# Patient Record
Sex: FEMALE | Race: BLACK OR AFRICAN AMERICAN | NOT HISPANIC OR LATINO | Employment: FULL TIME | URBAN - METROPOLITAN AREA
[De-identification: names, ages, dates, MRNs, and addresses within clinical notes are randomized per-mention and may not be internally consistent; named-entity substitution may affect disease eponyms.]

---

## 2017-07-28 ENCOUNTER — LAB (OUTPATIENT)
Dept: LAB | Facility: HOSPITAL | Age: 37
End: 2017-07-28

## 2017-07-28 ENCOUNTER — TRANSCRIBE ORDERS (OUTPATIENT)
Dept: LAB | Facility: HOSPITAL | Age: 37
End: 2017-07-28

## 2017-07-28 DIAGNOSIS — Z00.00 ROUTINE GENERAL MEDICAL EXAMINATION AT A HEALTH CARE FACILITY: ICD-10-CM

## 2017-07-28 DIAGNOSIS — Z00.00 ROUTINE GENERAL MEDICAL EXAMINATION AT A HEALTH CARE FACILITY: Primary | ICD-10-CM

## 2017-07-28 LAB
BILIRUB UR QL STRIP: NEGATIVE
CLARITY UR: CLEAR
COLOR UR: YELLOW
DEPRECATED RDW RBC AUTO: 41.5 FL (ref 37–54)
ERYTHROCYTE [DISTWIDTH] IN BLOOD BY AUTOMATED COUNT: 12.7 % (ref 11.3–14.5)
GLUCOSE UR STRIP-MCNC: NEGATIVE MG/DL
HCT VFR BLD AUTO: 38.5 % (ref 34.5–44)
HGB BLD-MCNC: 12.6 G/DL (ref 11.5–15.5)
HGB UR QL STRIP.AUTO: NEGATIVE
KETONES UR QL STRIP: NEGATIVE
LEUKOCYTE ESTERASE UR QL STRIP.AUTO: NEGATIVE
MCH RBC QN AUTO: 29.2 PG (ref 27–31)
MCHC RBC AUTO-ENTMCNC: 32.7 G/DL (ref 32–36)
MCV RBC AUTO: 89.3 FL (ref 80–99)
NITRITE UR QL STRIP: NEGATIVE
PH UR STRIP.AUTO: 7.5 [PH] (ref 5–8)
PLATELET # BLD AUTO: 255 10*3/MM3 (ref 150–450)
PMV BLD AUTO: 11 FL (ref 6–12)
PROT UR QL STRIP: NEGATIVE
RBC # BLD AUTO: 4.31 10*6/MM3 (ref 3.89–5.14)
SP GR UR STRIP: 1.02 (ref 1–1.03)
TSH SERPL DL<=0.05 MIU/L-ACNC: 1.09 MIU/ML (ref 0.35–5.35)
UROBILINOGEN UR QL STRIP: NORMAL
WBC NRBC COR # BLD: 5.28 10*3/MM3 (ref 3.5–10.8)

## 2017-07-28 PROCEDURE — 84443 ASSAY THYROID STIM HORMONE: CPT

## 2017-07-28 PROCEDURE — 81003 URINALYSIS AUTO W/O SCOPE: CPT

## 2017-07-28 PROCEDURE — 85027 COMPLETE CBC AUTOMATED: CPT

## 2017-07-28 PROCEDURE — 36415 COLL VENOUS BLD VENIPUNCTURE: CPT

## 2019-06-18 ENCOUNTER — TRANSCRIBE ORDERS (OUTPATIENT)
Dept: ADMINISTRATIVE | Facility: HOSPITAL | Age: 39
End: 2019-06-18

## 2019-06-18 DIAGNOSIS — Z12.31 VISIT FOR SCREENING MAMMOGRAM: Primary | ICD-10-CM

## 2019-08-14 ENCOUNTER — HOSPITAL ENCOUNTER (OUTPATIENT)
Dept: MAMMOGRAPHY | Facility: HOSPITAL | Age: 39
Discharge: HOME | End: 2019-08-14
Admitting: OBSTETRICS & GYNECOLOGY

## 2019-08-14 DIAGNOSIS — Z12.31 VISIT FOR SCREENING MAMMOGRAM: ICD-10-CM

## 2019-08-14 PROCEDURE — 77067 SCR MAMMO BI INCL CAD: CPT

## 2019-08-14 PROCEDURE — 77067 SCR MAMMO BI INCL CAD: CPT | Performed by: RADIOLOGY

## 2019-08-14 PROCEDURE — 77063 BREAST TOMOSYNTHESIS BI: CPT

## 2019-08-14 PROCEDURE — 77063 BREAST TOMOSYNTHESIS BI: CPT | Performed by: RADIOLOGY

## 2019-09-11 ENCOUNTER — HOSPITAL ENCOUNTER (OUTPATIENT)
Dept: MAMMOGRAPHY | Facility: HOSPITAL | Age: 39
Discharge: HOME | End: 2019-09-11
Admitting: RADIOLOGY

## 2019-09-11 ENCOUNTER — HOSPITAL ENCOUNTER (OUTPATIENT)
Dept: ULTRASOUND IMAGING | Facility: HOSPITAL | Age: 39
Discharge: HOME | End: 2019-09-11

## 2019-09-11 DIAGNOSIS — R92.8 ABNORMAL MAMMOGRAM: ICD-10-CM

## 2019-09-11 PROCEDURE — 77066 DX MAMMO INCL CAD BI: CPT | Performed by: RADIOLOGY

## 2019-09-11 PROCEDURE — G0279 TOMOSYNTHESIS, MAMMO: HCPCS

## 2019-09-11 PROCEDURE — 76642 ULTRASOUND BREAST LIMITED: CPT

## 2019-09-11 PROCEDURE — 76642 ULTRASOUND BREAST LIMITED: CPT | Performed by: RADIOLOGY

## 2019-09-11 PROCEDURE — 77066 DX MAMMO INCL CAD BI: CPT

## 2019-09-11 PROCEDURE — 77062 BREAST TOMOSYNTHESIS BI: CPT | Performed by: RADIOLOGY

## 2020-07-06 ENCOUNTER — LAB REQUISITION (OUTPATIENT)
Dept: LAB | Facility: HOSPITAL | Age: 40
End: 2020-07-06

## 2020-07-06 DIAGNOSIS — Z00.00 ROUTINE GENERAL MEDICAL EXAMINATION AT A HEALTH CARE FACILITY: ICD-10-CM

## 2020-12-18 ENCOUNTER — IMMUNIZATION (OUTPATIENT)
Dept: VACCINE CLINIC | Facility: HOSPITAL | Age: 40
End: 2020-12-18

## 2020-12-18 PROCEDURE — 0001A: CPT

## 2020-12-18 PROCEDURE — 91300 HC SARSCOV02 VAC 30MCG/0.3ML IM: CPT

## 2021-01-08 ENCOUNTER — IMMUNIZATION (OUTPATIENT)
Dept: VACCINE CLINIC | Facility: HOSPITAL | Age: 41
End: 2021-01-08

## 2021-01-08 PROCEDURE — 91300 HC SARSCOV02 VAC 30MCG/0.3ML IM: CPT | Performed by: INTERNAL MEDICINE

## 2021-01-08 PROCEDURE — 0002A: CPT | Performed by: INTERNAL MEDICINE

## 2021-01-08 PROCEDURE — 0001A: CPT | Performed by: INTERNAL MEDICINE

## 2021-05-25 ENCOUNTER — TRANSCRIBE ORDERS (OUTPATIENT)
Dept: ADMINISTRATIVE | Facility: HOSPITAL | Age: 41
End: 2021-05-25

## 2021-05-25 DIAGNOSIS — Z12.31 VISIT FOR SCREENING MAMMOGRAM: Primary | ICD-10-CM

## 2021-07-26 ENCOUNTER — HOSPITAL ENCOUNTER (OUTPATIENT)
Dept: MAMMOGRAPHY | Facility: HOSPITAL | Age: 41
Discharge: HOME | End: 2021-07-26

## 2021-07-26 DIAGNOSIS — Z12.31 VISIT FOR SCREENING MAMMOGRAM: ICD-10-CM

## 2021-12-30 ENCOUNTER — APPOINTMENT (OUTPATIENT)
Dept: MAMMOGRAPHY | Facility: HOSPITAL | Age: 41
End: 2021-12-30

## 2022-03-04 ENCOUNTER — HOSPITAL ENCOUNTER (OUTPATIENT)
Dept: MAMMOGRAPHY | Facility: HOSPITAL | Age: 42
Discharge: HOME | End: 2022-03-04
Admitting: OBSTETRICS & GYNECOLOGY

## 2022-03-04 PROCEDURE — 77063 BREAST TOMOSYNTHESIS BI: CPT

## 2022-03-04 PROCEDURE — 77063 BREAST TOMOSYNTHESIS BI: CPT | Performed by: RADIOLOGY

## 2022-03-04 PROCEDURE — 77067 SCR MAMMO BI INCL CAD: CPT

## 2022-03-04 PROCEDURE — 77067 SCR MAMMO BI INCL CAD: CPT | Performed by: RADIOLOGY

## 2022-12-31 RX ORDER — AZITHROMYCIN 250 MG/1
TABLET, FILM COATED ORAL
Qty: 6 TABLET | Refills: 0 | Status: SHIPPED | OUTPATIENT
Start: 2022-12-31 | End: 2023-01-05

## 2023-01-20 ENCOUNTER — OFFICE VISIT (OUTPATIENT)
Dept: PHYSICAL THERAPY | Facility: CLINIC | Age: 43
End: 2023-01-20
Payer: COMMERCIAL

## 2023-01-20 DIAGNOSIS — R49.0 DYSPHONIA: Primary | ICD-10-CM

## 2023-01-20 PROCEDURE — 92524 BEHAVRAL QUALIT ANALYS VOICE: CPT | Performed by: SPEECH-LANGUAGE PATHOLOGIST

## 2023-01-20 NOTE — PROGRESS NOTES
Outpatient Speech Language Pathology   Adult/Peds Voice Initial Evaluation   [address]       Patient Name: Yahaira Olivier  : 1980  MRN: [id number]  Today's Date: 2023         Visit Date: 2023   There is no problem list on file for this patient.       No past medical history on file.     No past surgical history on file.      Visit Dx:    ICD-10-CM ICD-9-CM   1. Dysphonia  R49.0 784.42            OP SLP Assessment/Plan - 23 1300        SLP Assessment    Functional Problems Voice  -RB    Impact on Function- Voice Difficulty communicating;Difficulty communicating in an emergency;Restrictions in personal and social life;Unable to complete specified job requirements;Difficulty participating in avocational activities  -RB    Clinical Impression- Voice Severe voice disorder  -RB    Clinical Impression- PVFM Does not appear to present with PVFM  -RB    Functional Problems Comment impacts QOL, communication, impacts work tasks  -RB    Clinical Impression Comments would benefit from skilled ST  -RB    Please refer to paper survey for additional self-reported information Yes  -RB    Please refer to items scanned into chart for additional diagnostic informaiton and handouts as provided by clinician Yes  -RB    SLP Diagnosis dysphonia  -RB    Prognosis Good (comment)  -RB    Patient/caregiver participated in establishment of treatment plan and goals Yes  -RB    Patient would benefit from skilled therapy intervention Yes  -RB       SLP Plan    Frequency 1-2x/weekly  -RB    Duration 12 weeks  -RB    Planned CPT's? SLP INDIVIDUAL SPEECH THERAPY: 64804  -RB    Expected Duration of Therapy Session (SLP Eval) 45  -RB    Plan Comments initiate POC  -RB          User Key  (r) = Recorded By, (t) = Taken By, (c) = Cosigned By    Initials Name Provider Type    Hoa Cesar SLP Speech and Language Pathologist                 Voice - 23 1300        General Voice History    Reflux History  Patient takes reflux medications  -RB    Typical Voice Use Vocation depends on voice use (comment);Uses voice for ADL's   midwife -RB    Symptoms Improved/Worsened By singing, laughing, higher pitches  -RB    Vocal Abuse/Misuse Talking excessive number of hours  -RB       Voice Assessment    Maximum Phonation Time and Interpretation MPT: 3 secs, below normal  -RB    Pitch Glide Characteristics pitch breaks  -RB    Pitch Range During Speech pitch breaks  -RB    Voice Onset WFL  -RB    Voice Features Observed Hoarseness;Strained/Strangled quality;Fast Fatigability;Unstable Loudness;Unstable Pitch;Difficulty initiating phonation  -RB    Resonance Characteristics WFL  -RB    Muscle Tension Observation Neck (comment);Shoulder (comment)  -RB    Breath Support- At Rest Mixed  -RB    Breath Support- During Sustained Phonation Mixed  -RB    Breath Support- During Conversation Mixed  -RB    Other Voice Observations significant muscle tension dysphonia, difficulty coordinating breathing to speech, insufficient breath support noted. SLP initiated SOVT, pausing, and easy onset, and muscle relaxation excercises with pt. SLP modeled SOVT with cup of water and straw. targeted bubbles excercise and phonation with straw and bubbles. targeted straw phonation. Targeted pausing with functional phrases. targeted easy onset with m and h word lists and syllables. showed video model of SOVT.  -RB       Measures and Scales for Voice    Measures and Scales for Voice Voice Handicap Index  -RB       Voice Handicap Index    Functional Subtest Score 30  -RB    Physical Subtest Score 39  -RB    Emotional Subtest Score 29  -RB    Total Score total score: 98 which indicates a severe severity. Pts's physical subtests were rated all 3-4 scores indicating this is a concern for the patient. See scan for full report.  -RB          User Key  (r) = Recorded By, (t) = Taken By, (c) = Cosigned By    Initials Name Provider Type    RB Hoa Alarcon, SLP  Speech and Language Pathologist                   SLP SLC Evaluation - 01/20/23 1300        Communication Assessment/Intervention    Document Type evaluation  -RB    Total Evaluation Minutes, SLP 45  -RB    Subjective Information no complaints  -RB    Patient Observations alert;cooperative;agree to therapy  -RB    Patient Effort excellent  -RB    Symptoms Noted During/After Treatment none  -RB       General Information    Patient Profile Reviewed yes  -RB    Pertinent History Of Current Problem PMH: 6 weeks ago pt had a upper respiratory infection. Following this she followed up with ENT after voice concerns. He notes a significant muscle tension dysphonia. Her symptoms get better when she laughs or sings. He recommended speech and omeprazole for her reflux management. She underwent laryngoscopy showed no lesions. She does note chronic sinus issues. She reports symptoms are worsening. 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 She notes this is impacting her work and is decreasing QOL. She feels she can't coordinate breathing to speech. Phone calls are extremely difficult.  -RB    Precautions/Limitations, Vision WFL with corrective lenses;for purposes of eval  -RB    Precautions/Limitations, Hearing WFL;for purposes of eval  -RB    Prior Level of Function-Communication WFL  -RB    Plans/Goals Discussed with patient;agreed upon  -RB    Barriers to Rehab none identified  -RB    Patient's Goals for Discharge functional communication;return to all previous roles/activities  -RB       Pain    Additional Documentation Pain Scale: Numbers Pre/Post-Treatment (Group)  -RB       Pain Scale: Numbers Pre/Post-Treatment    Pretreatment Pain Rating 0/10 - no pain  -RB    Posttreatment Pain Rating 0/10 - no pain  -RB          User Key  (r) = Recorded By, (t) = Taken By, (c) = Cosigned By    Initials Name Provider Type    Hoa Cesar SLP Speech and Language  Pathologist                  ACTIVITY  ACCURACY ASSISTANCE NEEDED   LTGs:   Pt will maintain a program of good vocal hygiene in all settings by developing an awareness of behaviors and lifestyle 90% no-min cues    Pt will be able to use voice in vocational and avocational activities without functional limitations due to hoarseness, strain, and fatigue at 90% no-min cues            STG:  Patient will reduce hyperfunctional use of the vocal mechanism via reducing tension in the shoulders, neck, jaw, face, mouth, and pharynx through completion of exercises at 90% no cues    Patient will reduce hyperfunctional use of the vocal mechanism via improved tone focus for easy phonation at 90% no-min cues                STG:   Patient will reduce hyperfunctional use of the vocal mechanism via improved use of diaphragmatic breathing at 95% no-min cues                  STG:  PT will participate in SOVT exercises independently at 90-95% no- min cues       STG:   Pt will utilize compensatory strategies in conversation at 95% no- min cues          Certification: 1/20/23-4/19/23             OP SLP Education     Row Name 01/20/23 1300       Education    Barriers to Learning No barriers identified  -RB    Education Provided Described results of evaluation;Patient expressed understanding of evaluation  -RB    Assessed Learning needs;Learning motivation;Learning preferences;Learning readiness  -RB    Learning Motivation Strong  -RB    Learning Method Explanation;Demonstration;Teach back;Written materials  -RB    Teaching Response Verbalized understanding;Demonstrated understanding;Reinforcement needed  -RB    Education Comments HEP: m and h words for easy onset, SOVT, resonant voice, breathing excercises, muscle relaxation excercises, compensatory excercises  -RB          User Key  (r) = Recorded By, (t) = Taken By, (c) = Cosigned By    Initials Name Effective Dates    Hoa Cesar SLP 06/16/21 -                     PHYSICIAN:  Watson Roque MD    NPI: [id number]         I certify that the therapy services are furnished while this patient is under my care.  The services outlined above are required by this patient, and will be reviewed every 90 days.                PHYSICIAN:                                         DATE:      Please sign and return via fax to [phone].   Thank you,   [facility].    [facility] Speech Language Pathology   [address]  [place] [address]  Hoa Alarcon MA CCC-SLP [facility] license: [id number]     1/20/2023

## 2023-01-21 RX ORDER — CYCLOBENZAPRINE HCL 10 MG
5 TABLET ORAL EVERY 8 HOURS PRN
Qty: 30 TABLET | Refills: 1 | Status: SHIPPED | OUTPATIENT
Start: 2023-01-21 | End: 2024-01-21

## 2023-01-27 ENCOUNTER — TREATMENT (OUTPATIENT)
Dept: PHYSICAL THERAPY | Facility: CLINIC | Age: 43
End: 2023-01-27
Payer: COMMERCIAL

## 2023-01-27 DIAGNOSIS — R49.0 DYSPHONIA: Primary | ICD-10-CM

## 2023-01-27 PROCEDURE — 92507 TX SP LANG VOICE COMM INDIV: CPT | Performed by: SPEECH-LANGUAGE PATHOLOGIST

## 2023-01-27 NOTE — PROGRESS NOTES
Outpatient Speech Language Pathology   Adult/Peds Voice Treatment Note  [address]       Patient Name: Yahaira Olivier  : 1980  MRN: [id number]  Today's Date: 2023         Visit Date: 2023   There is no problem list on file for this patient.       No past medical history on file.     No past surgical history on file.      Visit Dx:    ICD-10-CM ICD-9-CM   1. Dysphonia  R49.0 784.42            OP SLP Assessment/Plan -        SLP Assessment    Functional Problems Voice  -RB    Impact on Function- Voice Difficulty communicating;Difficulty communicating in an emergency;Restrictions in personal and social life;Unable to complete specified job requirements;Difficulty participating in avocational activities  -RB    Clinical Impression- Voice Severe voice disorder  -RB    Clinical Impression- PVFM Does not appear to present with PVFM  -RB    Functional Problems Comment impacts QOL, communication, impacts work tasks  -RB    Clinical Impression Comments Following HEP, pt able to produce voicing with less strain and tension following SOVT exercises, easy onset and confidential voice  -RB    Please refer to paper survey for additional self-reported information Yes  -RB    Please refer to items scanned into chart for additional diagnostic informaiton and handouts as provided by clinician Yes  -RB    SLP Diagnosis dysphonia  -RB    Prognosis Good (comment)  -RB    Patient/caregiver participated in establishment of treatment plan and goals Yes  -RB    Patient would benefit from skilled therapy intervention Yes  -RB       SLP Plan    Frequency 1-2x/weekly  -RB    Duration 11 weeks  -RB    Planned CPT's? SLP INDIVIDUAL SPEECH THERAPY: 06119  -RB    Expected Duration of Therapy Session (SLP Eval) 45  -RB    Plan Comments continue POC  -RB          User Key  (r) = Recorded By, (t) = Taken By, (c) = Cosigned By    Initials Name Provider Type    Hoa Cesar SLP Speech and Language Pathologist                   Pain: 0  Subjective: PT reports continued difficulty           ACTIVITY  ACCURACY ASSISTANCE NEEDED   LTGs:   Pt will maintain a program of good vocal hygiene in all settings by developing an awareness of behaviors and lifestyle 90% no-min cues    Pt will be able to use voice in vocational and avocational activities without functional limitations due to hoarseness, strain, and fatigue at 90% no-min cues                               Targeted SOVT, breathing, compensatory strategies, confidential voice     STG:  Patient will reduce hyperfunctional use of the vocal mechanism via reducing tension in the shoulders, neck, jaw, face, mouth, and pharynx through completion of exercises at 90% no cues    Patient will reduce hyperfunctional use of the vocal mechanism via improved tone focus for easy phonation at 90% no-min cues           Targeted muscle relaxation exercises                          Targeted easy onset, confidential voice  90%                            80% No cues                            No cues   STG:   Patient will reduce hyperfunctional use of the vocal mechanism via improved use of diaphragmatic breathing at 95% no-min cues             Targeted breathing excercises 80% Min cues   STG:  PT will participate in SOVT exercises independently at 90-95% no- min cues  Modeled targeted SOVT exercises, straw phonation  Bubbles, sound, glides, sound on and off 80% model   STG:   Pt will utilize compensatory strategies in conversation at 95% no- min cues Targeted pausing in conversation and at phrase/sentence level 80% Min cues       Certification: 1/20/23-4/19/23             OP SLP Education     Row Name        Education    Barriers to Learning No barriers identified  -RB    Education Provided Described results of evaluation;Patient expressed understanding of evaluation  -RB    Assessed Learning needs;Learning motivation;Learning preferences;Learning readiness  -RB    Learning Motivation Strong  -RB     Learning Method Explanation;Demonstration;Teach back;Written materials  -RB    Teaching Response Verbalized understanding;Demonstrated understanding;Reinforcement needed  -RB    Education Comments HEP: m and h , w, words for easy onset, SOVT, resonant voice, breathing excercises, muscle relaxation excercises, compensatory exercises, phrases  -RB          User Key  (r) = Recorded By, (t) = Taken By, (c) = Cosigned By    Initials Name Effective Dates    Hoa Cesar SLP 06/16/21 -                       Hoa Alarcon MA CCC-SLP [facility] license: [id number]     1/27/2023

## 2023-02-03 ENCOUNTER — TELEMEDICINE (OUTPATIENT)
Dept: PHYSICAL THERAPY | Facility: CLINIC | Age: 43
End: 2023-02-03
Payer: COMMERCIAL

## 2023-02-03 DIAGNOSIS — R49.0 DYSPHONIA: Primary | ICD-10-CM

## 2023-02-03 PROCEDURE — 92507 TX SP LANG VOICE COMM INDIV: CPT | Performed by: SPEECH-LANGUAGE PATHOLOGIST

## 2023-02-03 NOTE — PROGRESS NOTES
Outpatient Speech Language Pathology   Adult/Peds Voice Treatment Note  [address]       Patient Name: Yahaira Olivier  : 1980  MRN: [id number]  Today's Date: 2/3/2023         Visit Date: 2023   There is no problem list on file for this patient.       No past medical history on file.     No past surgical history on file.      Visit Dx:    ICD-10-CM ICD-9-CM   1. Dysphonia  R49.0 784.42        You have chosen to receive care through a telephone visit. Do you consent to use a telephone visit for your medical care today? YES  This was an audio and video enabled telemedicine encounter.       OP SLP Assessment/Plan -        SLP Assessment    Functional Problems Voice  -RB    Impact on Function- Voice Difficulty communicating;Difficulty communicating in an emergency;Restrictions in personal and social life;Unable to complete specified job requirements;Difficulty participating in avocational activities  -RB    Clinical Impression- Voice Severe voice disorder  -RB    Clinical Impression- PVFM Does not appear to present with PVFM  -RB    Functional Problems Comment impacts QOL, communication, impacts work tasks  -RB    Clinical Impression Comments Following HEP, pt able to produce voicing with less strain and tension following SOVT exercises, easy onset and confidential voice, and resonant voice therapy. Pt reports improvement especially in the morning, but gets fatigued by afternoon  -RB    Please refer to paper survey for additional self-reported information Yes  -RB    Please refer to items scanned into chart for additional diagnostic informaiton and handouts as provided by clinician Yes  -RB    SLP Diagnosis dysphonia  -RB    Prognosis Good (comment)  -RB    Patient/caregiver participated in establishment of treatment plan and goals Yes  -RB    Patient would benefit from skilled therapy intervention Yes  -RB       SLP Plan    Frequency 1-2x/weekly  -RB    Duration 10 weeks  -RB    Planned  CPT's? SLP INDIVIDUAL SPEECH THERAPY: 80312  -RB    Expected Duration of Therapy Session (SLP Kathya) 45  -RB    Plan Comments continue POC  -RB          User Key  (r) = Recorded By, (t) = Taken By, (c) = Cosigned By    Initials Name Provider Type    Hoa Cesar, SLP Speech and Language Pathologist                  Pain: 0  Subjective: PT reports improvement and decreasing some of her talking load at work          ACTIVITY  ACCURACY ASSISTANCE NEEDED   LTGs:   Pt will maintain a program of good vocal hygiene in all settings by developing an awareness of behaviors and lifestyle 90% no-min cues    Pt will be able to use voice in vocational and avocational activities without functional limitations due to hoarseness, strain, and fatigue at 90% no-min cues           Educated on vocal rests as needed                    Targeted SOVT, breathing, resonant voice therapy, compensatory strategies, confidential voice, phonation excercises     STG:  Patient will reduce hyperfunctional use of the vocal mechanism via reducing tension in the shoulders, neck, jaw, face, mouth, and pharynx through completion of exercises at 90% no cues    Patient will reduce hyperfunctional use of the vocal mechanism via improved tone focus for easy phonation at 90% no-min cues           Continue HEP                          Targeted easy onset, confidential voice  90%                            80% No cues                            No cues   STG:   Patient will reduce hyperfunctional use of the vocal mechanism via improved use of diaphragmatic breathing at 95% no-min cues   Targeted breathing excercises 85% Min cues   STG:  PT will participate in SOVT exercises independently at 90-95% no- min cues  Modeled/ targeted SOVT exercises, straw phonation  Bubbles, sound, glides, sound on and off 85% No cues   STG:   Pt will utilize compensatory strategies in conversation at 95% no- min cues Targeted pausing in conversation and at phrase/sentence level  85% Min cues       Certification: 1/20/23-4/19/23             OP SLP Education     Row Name        Education    Barriers to Learning No barriers identified  -RB    Education Provided Described results of evaluation;Patient expressed understanding of evaluation  -RB    Assessed Learning needs;Learning motivation;Learning preferences;Learning readiness  -RB    Learning Motivation Strong  -RB    Learning Method Explanation;Demonstration;Teach back;Written materials  -RB    Teaching Response Verbalized understanding;Demonstrated understanding;Reinforcement needed  -RB    Education Comments HEP: m and h , w, words for easy onset, SOVT, resonant voice, breathing excercises, muscle relaxation excercises, compensatory exercises, phrases, phonation excercises  -RB          User Key  (r) = Recorded By, (t) = Taken By, (c) = Cosigned By    Initials Name Effective Dates    Hoa Cesar SLP 06/16/21 -                       Hoa Alracon MA, CCC-SLP [facility] license: [id number]     2/3/2023

## 2023-02-10 ENCOUNTER — TREATMENT (OUTPATIENT)
Dept: PHYSICAL THERAPY | Facility: CLINIC | Age: 43
End: 2023-02-10
Payer: COMMERCIAL

## 2023-02-10 DIAGNOSIS — R49.0 DYSPHONIA: Primary | ICD-10-CM

## 2023-02-10 PROCEDURE — 92507 TX SP LANG VOICE COMM INDIV: CPT | Performed by: SPEECH-LANGUAGE PATHOLOGIST

## 2023-02-10 NOTE — PROGRESS NOTES
Outpatient Speech Language Pathology   Adult/Peds Voice Treatment Note  [address]       Patient Name: Yahaira Olivier  : 1980  MRN: [id number]  Today's Date: 2/10/2023         Visit Date: 02/10/2023   There is no problem list on file for this patient.       No past medical history on file.     No past surgical history on file.      Visit Dx:    ICD-10-CM ICD-9-CM   1. Dysphonia  R49.0 784.42               OP SLP Assessment/Plan -        SLP Assessment    Functional Problems Voice  -RB    Impact on Function- Voice Difficulty communicating;Difficulty communicating in an emergency;Restrictions in personal and social life;Unable to complete specified job requirements;Difficulty participating in avocational activities  -RB    Clinical Impression- Voice Severe voice disorder  -RB    Clinical Impression- PVFM Does not appear to present with PVFM  -RB    Functional Problems Comment impacts QOL, communication, impacts work tasks  -RB    Clinical Impression Comments Following HEP, pt able to produce voicing with less strain and tension following SOVT exercises, easy onset and confidential voice, and resonant voice therapy. Pt able to speak with noticeably less tension today after SOVT, resonant voice targets. Pt needed less cues for forward focus.  -RB    Please refer to paper survey for additional self-reported information Yes  -RB    Please refer to items scanned into chart for additional diagnostic informaiton and handouts as provided by clinician Yes  -RB    SLP Diagnosis dysphonia  -RB    Prognosis Good (comment)  -RB    Patient/caregiver participated in establishment of treatment plan and goals Yes  -RB    Patient would benefit from skilled therapy intervention Yes  -RB       SLP Plan    Frequency 1-2x/weekly  -RB    Duration 9 weeks  -RB    Planned CPT's? SLP INDIVIDUAL SPEECH THERAPY: 79797  -RB    Expected Duration of Therapy Session (SLP Eval) 45  -RB    Plan Comments continue POC  -RB           User Key  (r) = Recorded By, (t) = Taken By, (c) = Cosigned By    Initials Name Provider Type    Hoa Cesar, SLP Speech and Language Pathologist                  Pain: 0  Subjective: PT reports improvement and decreasing some of her talking load at work          ACTIVITY  ACCURACY ASSISTANCE NEEDED   LTGs:   Pt will maintain a program of good vocal hygiene in all settings by developing an awareness of behaviors and lifestyle 90% no-min cues    Pt will be able to use voice in vocational and avocational activities without functional limitations due to hoarseness, strain, and fatigue at 90% no-min cues           Educated on vocal rests as needed, discussed note taking for sessions at work                Targeted SOVT, breathing, resonant voice therapy, compensatory strategies, confidential voice, phonation excercises     STG:  Patient will reduce hyperfunctional use of the vocal mechanism via reducing tension in the shoulders, neck, jaw, face, mouth, and pharynx through completion of exercises at 90% no cues    Patient will reduce hyperfunctional use of the vocal mechanism via improved tone focus for easy phonation at 90% no-min cues           Continue HEP                          Targeted easy onset, confidential voice, resonant voice, SOVT, initiated vocal function exercises  90%                            80% No cues                            No cues   STG:   Patient will reduce hyperfunctional use of the vocal mechanism via improved use of diaphragmatic breathing at 95% no-min cues   Targeted breathing excercises 85% Min cues   STG:  PT will participate in SOVT exercises independently at 90-95% no- min cues  Modeled/ targeted SOVT exercises, straw phonation  Bubbles, sound, glides, sound on and off 85-90% No cues   STG:   Pt will utilize compensatory strategies in conversation at 95% no- min cues Targeted pausing in conversation and at phrase/sentence level  Role played work conversation/scenarios  85% Min  cues       Certification: 1/20/23-4/19/23             OP SLP Education     Row Name        Education    Barriers to Learning No barriers identified  -RB    Education Provided Described results of evaluation;Patient expressed understanding of evaluation  -RB    Assessed Learning needs;Learning motivation;Learning preferences;Learning readiness  -RB    Learning Motivation Strong  -RB    Learning Method Explanation;Demonstration;Teach back;Written materials  -RB    Teaching Response Verbalized understanding;Demonstrated understanding;Reinforcement needed  -RB    Education Comments HEP: m and h , w, words for easy onset, SOVT, resonant voice, breathing excercises, muscle relaxation excercises, compensatory exercises, phrases, phonation excercises , vocal function exercises  -RB          User Key  (r) = Recorded By, (t) = Taken By, (c) = Cosigned By    Initials Name Effective Dates    Hoa Cesar SLP 06/16/21 -                       Hoa Alarcon MA CCC-SLP [facility] license: [id number]     2/10/2023

## 2023-02-15 ENCOUNTER — TELEMEDICINE (OUTPATIENT)
Dept: PHYSICAL THERAPY | Facility: CLINIC | Age: 43
End: 2023-02-15
Payer: COMMERCIAL

## 2023-02-15 DIAGNOSIS — R49.0 DYSPHONIA: Primary | ICD-10-CM

## 2023-02-15 PROCEDURE — 92507 TX SP LANG VOICE COMM INDIV: CPT | Performed by: SPEECH-LANGUAGE PATHOLOGIST

## 2023-02-15 NOTE — PROGRESS NOTES
Outpatient Speech Language Pathology   Adult/Peds Voice Treatment Note  [address]       Patient Name: Yahaira Olivier  : 1980  MRN: [id number]  Today's Date: 2/15/2023         Visit Date: 02/15/2023   There is no problem list on file for this patient.       No past medical history on file.     No past surgical history on file.      Visit Dx:    ICD-10-CM ICD-9-CM   1. Dysphonia  R49.0 784.42               OP SLP Assessment/Plan -        SLP Assessment    Functional Problems Voice  -RB    Impact on Function- Voice Difficulty communicating;Difficulty communicating in an emergency;Restrictions in personal and social life;Unable to complete specified job requirements;Difficulty participating in avocational activities  -RB    Clinical Impression- Voice Severe voice disorder  -RB         Functional Problems Comment impacts QOL, communication, impacts work tasks  -RB    Clinical Impression Comments Following HEP, pt continues to demonstrate improvement, at sentence level her vocal quality demonstrates less strain and tension, continues to have difficulty in conversation, however this has improved. Targeted breathing and pausing in conversation today.  -RB    Please refer to paper survey for additional self-reported information Yes  -RB    Please refer to items scanned into chart for additional diagnostic informaiton and handouts as provided by clinician Yes  -RB    SLP Diagnosis dysphonia  -RB    Prognosis Good (comment)  -RB    Patient/caregiver participated in establishment of treatment plan and goals Yes  -RB    Patient would benefit from skilled therapy intervention Yes  -RB       SLP Plan    Frequency 1-2x/weekly  -RB    Duration 8 weeks  -RB    Planned CPT's? SLP INDIVIDUAL SPEECH THERAPY: 04709  -RB    Expected Duration of Therapy Session (SLP Eval) 45  -RB    Plan Comments continue POC  -RB          User Key  (r) = Recorded By, (t) = Taken By, (c) = Cosigned By    Initials Name Provider Type     Hoa Cesar, SLP Speech and Language Pathologist                  Pain: 0  Subjective: PT reported an improvement with her voice while at home and at the start of her work day. By the afternoon her voice gives her difficulty.           ACTIVITY  ACCURACY ASSISTANCE NEEDED   LTGs:   Pt will maintain a program of good vocal hygiene in all settings by developing an awareness of behaviors and lifestyle 90% no-min cues    Pt will be able to use voice in vocational and avocational activities without functional limitations due to hoarseness, strain, and fatigue at 90% no-min cues           Educated on vocal rests as needed, discussed note taking for sessions at work                Targeted SOVT, breathing, resonant voice therapy, compensatory strategies, confidential voice, phonation excercises     STG:  Patient will reduce hyperfunctional use of the vocal mechanism via reducing tension in the shoulders, neck, jaw, face, mouth, and pharynx through completion of exercises at 90% no cues    Patient will reduce hyperfunctional use of the vocal mechanism via improved tone focus for easy phonation at 90% no-min cues           Continue HEP                          Targeted easy onset, confidential voice, resonant voice, SOVT,  vocal function exercises                              80%                             No cues   STG:   Patient will reduce hyperfunctional use of the vocal mechanism via improved use of diaphragmatic breathing at 95% no-min cues   Targeted breathing exercises, diaphragmatic breathing in conversation 85% Min cues   STG:  PT will participate in SOVT exercises independently at 90-95% no- min cues  targeted SOVT exercises, straw phonation  Bubbles, sound, glides, sound on and off 85-90% No cues   STG:   Pt will utilize compensatory strategies in conversation at 95% no- min cues Targeted pausing in conversation and at phrase/sentence level  Role played work conversation/scenarios  85% Min cues        Certification: 1/20/23-4/19/23             OP SLP Education     Row Name        Education    Barriers to Learning No barriers identified  -RB    Education Provided Described results of evaluation;Patient expressed understanding of evaluation  -RB    Assessed Learning needs;Learning motivation;Learning preferences;Learning readiness  -RB    Learning Motivation Strong  -RB    Learning Method Explanation;Demonstration;Teach back;Written materials  -RB    Teaching Response Verbalized understanding;Demonstrated understanding;Reinforcement needed  -RB    Education Comments HEP: m and h , w, words for easy onset and sentences, SOVT, resonant voice, breathing excercises, muscle relaxation excercises, compensatory exercises, phrases, phonation excercises , vocal function exercises, visual feedback  -RB          User Key  (r) = Recorded By, (t) = Taken By, (c) = Cosigned By    Initials Name Effective Dates    Hoa Cesar SLP 06/16/21 -                       Hoa Alarcon MA, CCC-SLP [facility] license: [id number]     2/15/2023

## 2023-02-27 ENCOUNTER — TREATMENT (OUTPATIENT)
Dept: PHYSICAL THERAPY | Facility: CLINIC | Age: 43
End: 2023-02-27
Payer: COMMERCIAL

## 2023-02-27 DIAGNOSIS — R49.0 DYSPHONIA: Primary | ICD-10-CM

## 2023-02-27 PROCEDURE — 92507 TX SP LANG VOICE COMM INDIV: CPT | Performed by: SPEECH-LANGUAGE PATHOLOGIST

## 2023-02-27 NOTE — PROGRESS NOTES
Outpatient Speech Language Pathology   Adult/Peds Voice Treatment Note  [address] [place] [address]       Patient Name: Yahaira Olivier  : 1980  MRN: [id number]  Today's Date: 2023         Visit Date: 2023   There is no problem list on file for this patient.       No past medical history on file.     No past surgical history on file.      Visit Dx:    ICD-10-CM ICD-9-CM   1. Dysphonia  R49.0 784.42               OP SLP Assessment/Plan -        SLP Assessment    Functional Problems Voice  -RB    Impact on Function- Voice Difficulty communicating;Difficulty communicating in an emergency;Restrictions in personal and social life;Unable to complete specified job requirements;Difficulty participating in avocational activities  -RB    Clinical Impression- Voice Severe voice disorder  -RB         Functional Problems Comment impacts QOL, communication, impacts work tasks  -RB    Clinical Impression Comments Following HEP, pt continues to demonstrate improvement, targeting self monitoring in conversation and at sentence level and pt noted to self correct more in conversation today   -RB    Please refer to paper survey for additional self-reported information Yes  -RB    Please refer to items scanned into chart for additional diagnostic informaiton and handouts as provided by clinician Yes  -RB    SLP Diagnosis dysphonia  -RB    Prognosis Good (comment)  -RB    Patient/caregiver participated in establishment of treatment plan and goals Yes  -RB    Patient would benefit from skilled therapy intervention Yes  -RB       SLP Plan    Frequency 1-2x/weekly  -RB    Duration 7 weeks  -RB    Planned CPT's? SLP INDIVIDUAL SPEECH THERAPY: 32528  -RB    Expected Duration of Therapy Session (SLP Eval) 45  -RB    Plan Comments continue POC  -RB          User Key  (r) = Recorded By, (t) = Taken By, (c) = Cosigned By    Initials Name Provider Type    Hoa Cesar SLP Speech and Language Pathologist                  Pain:  0  Subjective: Pt reports having some improvement, continues to note afternoons at work are difficult            ACTIVITY  ACCURACY ASSISTANCE NEEDED   LTGs:   Pt will maintain a program of good vocal hygiene in all settings by developing an awareness of behaviors and lifestyle 90% no-min cues    Pt will be able to use voice in vocational and avocational activities without functional limitations due to hoarseness, strain, and fatigue at 90% no-min cues           Continue to educate on vocal rests as needed, discussed note taking for sessions at work                Targeted SOVT, breathing, resonant voice therapy, compensatory strategies, confidential voice, phonation exercises, relaxation techniques, VFEs     STG:  Patient will reduce hyperfunctional use of the vocal mechanism via reducing tension in the shoulders, neck, jaw, face, mouth, and pharynx through completion of exercises at 90% no cues    Patient will reduce hyperfunctional use of the vocal mechanism via improved tone focus for easy phonation at 90% no-min cues           Continue HEP                          Targeted easy onset, confidential voice, resonant voice, SOVT,  vocal function exercises, targeted relaxation techniques                             80%                             No cues   STG:   Patient will reduce hyperfunctional use of the vocal mechanism via improved use of diaphragmatic breathing at 95% no-min cues   Targeted breathing exercises, diaphragmatic breathing in conversation 85% Min cues   STG:  PT will participate in SOVT exercises independently at 90-95% no- min cues  targeted SOVT exercises, straw phonation  Bubbles, sound, glides, sound on and off 90% No cues   STG:   Pt will utilize compensatory strategies in conversation at 95% no- min cues Targeted pausing in conversation and at phrase/sentence level  Role played work conversation/scenarios   Targeted self monitoring strategies in conversation and at sentence level 85% Min  cues       Certification: 1/20/23-4/19/23             OP SLP Education     Row Name        Education    Barriers to Learning No barriers identified  -RB    Education Provided Described results of evaluation;Patient expressed understanding of evaluation  -RB    Assessed Learning needs;Learning motivation;Learning preferences;Learning readiness  -RB    Learning Motivation Strong  -RB    Learning Method Explanation;Demonstration;Teach back;Written materials  -RB    Teaching Response Verbalized understanding;Demonstrated understanding;Reinforcement needed  -RB    Education Comments HEP: m and h , w, words for easy onset and sentences, SOVT, resonant voice, breathing excercises, muscle relaxation excercises, compensatory exercises, phrases, phonation excercises , vocal function exercises, visual feedback, tactile feedback, self monitoring, m sentences, reducing tension  -RB          User Key  (r) = Recorded By, (t) = Taken By, (c) = Cosigned By    Initials Name Effective Dates    Hoa Cesar SLP 06/16/21 -                       Hoa Alarcon MA, CCC-SLP [facility]  [place] license: [id number]     2/27/2023

## 2023-03-06 ENCOUNTER — TREATMENT (OUTPATIENT)
Dept: PHYSICAL THERAPY | Facility: CLINIC | Age: 43
End: 2023-03-06
Payer: COMMERCIAL

## 2023-03-06 DIAGNOSIS — R49.0 DYSPHONIA: Primary | ICD-10-CM

## 2023-03-06 PROCEDURE — 92507 TX SP LANG VOICE COMM INDIV: CPT | Performed by: SPEECH-LANGUAGE PATHOLOGIST

## 2023-03-07 NOTE — PROGRESS NOTES
Outpatient Speech Language Pathology   Adult/Peds Voice Treatment Note  [address]       Patient Name: Yahaira Olivier  : 1980  MRN: [id number]  Today's Date: 3/7/2023         Visit Date: 2023   There is no problem list on file for this patient.       No past medical history on file.     No past surgical history on file.      Visit Dx:    ICD-10-CM ICD-9-CM   1. Dysphonia  R49.0 784.42               OP SLP Assessment/Plan -        SLP Assessment    Functional Problems Voice  -RB    Impact on Function- Voice Difficulty communicating;Difficulty communicating in an emergency;Restrictions in personal and social life;Unable to complete specified job requirements;Difficulty participating in avocational activities  -RB    Clinical Impression- Voice Severe voice disorder  -RB         Functional Problems Comment impacts QOL, communication, impacts work tasks  -RB    Clinical Impression Comments Following HEP, pt continues to demonstrate improvement, targeting self monitoring in conversation , pt self correcting more often, pt able to communicate more effectively.   -RB    Please refer to paper survey for additional self-reported information Yes  -RB    Please refer to items scanned into chart for additional diagnostic informaiton and handouts as provided by clinician Yes  -RB    SLP Diagnosis dysphonia  -RB    Prognosis Good (comment)  -RB    Patient/caregiver participated in establishment of treatment plan and goals Yes  -RB    Patient would benefit from skilled therapy intervention Yes  -RB       SLP Plan    Frequency 1-2x/weekly  -RB    Duration 6 weeks  -RB    Planned CPT's? SLP INDIVIDUAL SPEECH THERAPY: 74820  -RB    Expected Duration of Therapy Session (SLP Eval) 45  -RB    Plan Comments continue POC  -RB          User Key  (r) = Recorded By, (t) = Taken By, (c) = Cosigned By    Initials Name Provider Type    Hoa Cesar SLP Speech and Language Pathologist                  Pain:  "0  Subjective: Pt reports having more good days than bad days. She feels she is self monitoring more and taking \"resets\" to ease tension.            ACTIVITY  ACCURACY ASSISTANCE NEEDED   LTGs:   Pt will maintain a program of good vocal hygiene in all settings by developing an awareness of behaviors and lifestyle 90% no-min cues    Pt will be able to use voice in vocational and avocational activities without functional limitations due to hoarseness, strain, and fatigue at 90% no-min cues           Continue to educate on vocal rests as needed, discussed note taking for sessions at work                Targeted SOVT, breathing, resonant voice therapy, compensatory strategies, confidential voice, phonation exercises, relaxation techniques, VFEs     STG:  Patient will reduce hyperfunctional use of the vocal mechanism via reducing tension in the shoulders, neck, jaw, face, mouth, and pharynx through completion of exercises at 90% no cues    Patient will reduce hyperfunctional use of the vocal mechanism via improved tone focus for easy phonation at 90% no-min cues           Continue HEP, targeted shoulder shrugs, circumlaryngeal massage with video model                Targeted easy onset, confidential voice, resonant voice, SOVT,  vocal function exercises, targeted relaxation techniques                             80%                             No cues   STG:   Patient will reduce hyperfunctional use of the vocal mechanism via improved use of diaphragmatic breathing at 95% no-min cues   Targeted breathing exercises, diaphragmatic breathing in conversation 85-90% Min cues   STG:  PT will participate in SOVT exercises independently at 90-95% no- min cues  targeted SOVT exercises, straw phonation  Bubbles, sound, glides, sound on and off 95% No cues   STG:   Pt will utilize compensatory strategies in conversation at 95% no- min cues Targeted pausing in conversation and at phrase/sentence level  Role played work " conversation/scenarios   Targeted self monitoring strategies in conversation and at sentence level 85% Min cues       Certification: 1/20/23-4/19/23             OP SLP Education     Row Name        Education    Barriers to Learning No barriers identified  -RB    Education Provided Described results of evaluation;Patient expressed understanding of evaluation  -RB    Assessed Learning needs;Learning motivation;Learning preferences;Learning readiness  -RB    Learning Motivation Strong  -RB    Learning Method Explanation;Demonstration;Teach back;Written materials  -RB    Teaching Response Verbalized understanding;Demonstrated understanding;Reinforcement needed  -RB    Education Comments HEP: m and h , w, words for easy onset and sentences, SOVT, resonant voice, breathing excercises, muscle relaxation excercises, compensatory exercises, phrases, phonation excercises , vocal function exercises, visual feedback, tactile feedback, self monitoring, m sentences, reducing tension, cicrumlaryngeal massage  -RB          User Key  (r) = Recorded By, (t) = Taken By, (c) = Cosigned By    Initials Name Effective Dates    Hoa Cesar SLP 06/16/21 -                       Hoa Alarcon MA CCC-SLP [facility] license: [id number]     3/7/2023

## 2023-03-17 ENCOUNTER — TELEMEDICINE (OUTPATIENT)
Dept: PHYSICAL THERAPY | Facility: CLINIC | Age: 43
End: 2023-03-17
Payer: COMMERCIAL

## 2023-03-17 DIAGNOSIS — R49.0 DYSPHONIA: Primary | ICD-10-CM

## 2023-03-17 PROCEDURE — 92507 TX SP LANG VOICE COMM INDIV: CPT | Performed by: SPEECH-LANGUAGE PATHOLOGIST

## 2023-03-17 NOTE — PROGRESS NOTES
Outpatient Speech Language Pathology   Adult/Peds Voice Treatment Note  [address]       Patient Name: Yahaira Olivier  : 1980  MRN: [id number]  Today's Date: 3/17/2023         Visit Date: 2023   There is no problem list on file for this patient.       No past medical history on file.     No past surgical history on file.      Visit Dx:    ICD-10-CM ICD-9-CM   1. Dysphonia  R49.0 784.42        You have chosen to receive care through a telephone visit. Do you consent to use a telephone visit for your medical care today? YES  This was an audio and video enabled telemedicine encounter.         OP SLP Assessment/Plan -        SLP Assessment    Functional Problems Voice  -RB    Impact on Function- Voice Difficulty communicating;Difficulty communicating in an emergency;Restrictions in personal and social life;Unable to complete specified job requirements;Difficulty participating in avocational activities  -RB    Clinical Impression- Voice Severe voice disorder  -RB         Functional Problems Comment impacts QOL, communication, impacts work tasks  -RB    Clinical Impression Comments Following HEP, pt reports over use of voice this past week and increased tension and tightness, discussed voice rest .   -RB    Please refer to paper survey for additional self-reported information Yes  -RB    Please refer to items scanned into chart for additional diagnostic informaiton and handouts as provided by clinician Yes  -RB    SLP Diagnosis dysphonia  -RB    Prognosis Good (comment)  -RB    Patient/caregiver participated in establishment of treatment plan and goals Yes  -RB    Patient would benefit from skilled therapy intervention Yes  -RB       SLP Plan    Frequency 1-2x/weekly  -RB    Duration 5 weeks  -RB    Planned CPT's? SLP INDIVIDUAL SPEECH THERAPY: 26165  -RB    Expected Duration of Therapy Session (SLP Eval) 45  -RB    Plan Comments continue POC  -RB          User Key  (r) = Recorded By, (t) =  Taken By, (c) = Cosigned By    Initials Name Provider Type    Hoa Cesar SLP Speech and Language Pathologist                  Pain: 0  Subjective: PT reports a rough week with voice. She notes having to use her voice frequently for work and some stressful situations that has caused tension and tightness. She notes some throat pain. Discussed taking some voice rest. Pt does not have to work until Monday and is not on call.         ACTIVITY  ACCURACY ASSISTANCE NEEDED   LTGs:   Pt will maintain a program of good vocal hygiene in all settings by developing an awareness of behaviors and lifestyle 90% no-min cues    Pt will be able to use voice in vocational and avocational activities without functional limitations due to hoarseness, strain, and fatigue at 90% no-min cues           Discussed voice rest for the next few days due to over working voice this past week                Targeted SOVT, breathing, resonant voice therapy, compensatory strategies, confidential voice, flow phonation exercises, relaxation techniques, VFEs     STG:  Patient will reduce hyperfunctional use of the vocal mechanism via reducing tension in the shoulders, neck, jaw, face, mouth, and pharynx through completion of exercises at 90% no cues    Patient will reduce hyperfunctional use of the vocal mechanism via improved tone focus for easy phonation at 90% no-min cues           Continue HEP, targeted shoulder shrugs, circumlaryngeal massage with video model- sent pt videos, targeted muscle relaxation excercises            Targeted easy onset, confidential voice, resonant voice, SOVT,  vocal function exercises, targeted relaxation techniques- pt struggled with producing voicing today due to tension   90%                          70%   model                          No cues   STG:   Patient will reduce hyperfunctional use of the vocal mechanism via improved use of diaphragmatic breathing at 95% no-min cues   Targeted breathing exercises,  diaphragmatic breathing in conversation 85% Min cues   STG:  PT will participate in SOVT exercises independently at 90-95% no- min cues  targeted SOVT exercises, straw phonation  Bubbles, sound, glides, sound on and off 95% No cues   STG:   Pt will utilize compensatory strategies in conversation at 95% no- min cues Targeted pausing and breathing in conversation and at phrase/sentence level    Targeted self monitoring strategies for resonant voice 85% Min cues       Certification: 1/20/23-4/19/23             OP SLP Education     Row Name        Education    Barriers to Learning No barriers identified  -RB    Education Provided Described results of evaluation;Patient expressed understanding of evaluation  -RB    Assessed Learning needs;Learning motivation;Learning preferences;Learning readiness  -RB    Learning Motivation Strong  -RB    Learning Method Explanation;Demonstration;Teach back;Written materials  -RB    Teaching Response Verbalized understanding;Demonstrated understanding;Reinforcement needed  -RB    Education Comments HEP: m and h , w, words for easy onset and sentences, SOVT, resonant voice, breathing excercises, muscle relaxation excercises, compensatory exercises, phrases, phonation excercises , vocal function exercises, visual feedback, tactile feedback, self monitoring, m sentences, reducing tension, cicrumlaryngeal massage videos, flow phonation  -RB          User Key  (r) = Recorded By, (t) = Taken By, (c) = Cosigned By    Initials Name Effective Dates    Hoa Cesar SLP 06/16/21 -                       Hoa Alarcon MA, CCC-SLP [facility] license: [id number]     3/17/2023

## 2023-03-31 ENCOUNTER — TREATMENT (OUTPATIENT)
Dept: PHYSICAL THERAPY | Facility: CLINIC | Age: 43
End: 2023-03-31
Payer: COMMERCIAL

## 2023-03-31 DIAGNOSIS — R49.0 DYSPHONIA: Primary | ICD-10-CM

## 2023-03-31 PROCEDURE — 92507 TX SP LANG VOICE COMM INDIV: CPT | Performed by: SPEECH-LANGUAGE PATHOLOGIST

## 2023-03-31 NOTE — PROGRESS NOTES
Outpatient Speech Language Pathology   Adult/Peds Voice Progress Note  [address]       Patient Name: Yahaira Olivier  : 1980  MRN: [id number]  Today's Date: 3/31/2023         Visit Date: 2023   There is no problem list on file for this patient.       No past medical history on file.     No past surgical history on file.      Visit Dx:    ICD-10-CM ICD-9-CM   1. Dysphonia  R49.0 784.42              OP SLP Assessment/Plan -        SLP Assessment    Functional Problems Voice  -RB    Impact on Function- Voice Difficulty communicating;Difficulty communicating in an emergency;Restrictions in personal and social life;Unable to complete specified job requirements;Difficulty participating in avocational activities  -RB    Clinical Impression- Voice Severe voice disorder  -RB         Functional Problems Comment impacts QOL, communication, impacts work tasks  -RB    Clinical Impression Comments Pt shows symptoms of SD vs MTD. Recommend speaking with ENT about possible botox injections.   -RB    Please refer to paper survey for additional self-reported information Yes  -RB    Please refer to items scanned into chart for additional diagnostic informaiton and handouts as provided by clinician Yes  -RB    SLP Diagnosis dysphonia  -RB    Prognosis Good (comment)  -RB    Patient/caregiver participated in establishment of treatment plan and goals Yes  -RB    Patient would benefit from skilled therapy intervention Yes  -RB       SLP Plan    Frequency 1-2x/weekly  -RB    Duration 5 weeks  -RB    Planned CPT's? SLP INDIVIDUAL SPEECH THERAPY: 51766  -RB    Expected Duration of Therapy Session (SLP Eval) 45  -RB    Plan Comments On hold until ENT appt to see about SD  -RB          User Key  (r) = Recorded By, (t) = Taken By, (c) = Cosigned By    Initials Name Provider Type    Hoa Cesar SLP Speech and Language Pathologist                  Pain: 0  Subjective: Pt scheduled to see ENT to discuss spasmodic  dysphonia and botox injections. PT shows signs of having SD due to being able to produce voice when talking high pitched, laughing and crying. Pt does see some progress with resonant voice therapy and SOVT therapy. Pt has had stressful past few weeks and has not been able to utilize much voice rest. She is using her phone to speak for her on occasion.          ACTIVITY  ACCURACY ASSISTANCE NEEDED   LTGs:   Pt will maintain a program of good vocal hygiene in all settings by developing an awareness of behaviors and lifestyle 90% no-min cues    Pt will be able to use voice in vocational and avocational activities without functional limitations due to hoarseness, strain, and fatigue at 90% no-min cues           Discussed voice rest as able                Targeted SOVT, breathing, resonant voice therapy, flow phonation, relaxation excercises     STG:  Patient will reduce hyperfunctional use of the vocal mechanism via reducing tension in the shoulders, neck, jaw, face, mouth, and pharynx through completion of exercises at 90% no cues    Patient will reduce hyperfunctional use of the vocal mechanism via improved tone focus for easy phonation at 90% no-min cues           Continue HEP, relaxation exercises                        Targeted resonant voice, SOVT, flow phonation   90%                        80%-single words /m/ resonant voice   model                          model   STG:   Patient will reduce hyperfunctional use of the vocal mechanism via improved use of diaphragmatic breathing at 95% no-min cues   Targeted breathing exercises, diaphragmatic breathing in conversation 85% Min cues   STG:  PT will participate in SOVT exercises independently at 90-95% no- min cues  Continue SOVT HEP     STG:   Pt will utilize compensatory strategies in conversation at 95% no- min cues Targeted pausing and breathing in conversation and at phrase/sentence level    Targeted self monitoring strategies for resonant voice 80% Min cues        Certification: 1/20/23-4/19/23             OP SLP Education     Row Name        Education    Barriers to Learning No barriers identified  -RB    Education Provided Described results of evaluation;Patient expressed understanding of evaluation  -RB    Assessed Learning needs;Learning motivation;Learning preferences;Learning readiness  -RB    Learning Motivation Strong  -RB    Learning Method Explanation;Demonstration;Teach back;Written materials  -RB    Teaching Response Verbalized understanding;Demonstrated understanding;Reinforcement needed  -RB    Education Comments HEP: resonant voice and SOVT, dysphonia resources -RB          User Key  (r) = Recorded By, (t) = Taken By, (c) = Cosigned By    Initials Name Effective Dates    Hoa Cesar SLP 06/16/21 -                       Hoa Alarcon MA CCC-SLP [facility]  [place] license: [id number]     3/31/2023

## 2025-06-26 ENCOUNTER — TRANSCRIBE ORDERS (OUTPATIENT)
Dept: ADMINISTRATIVE | Facility: HOSPITAL | Age: 45
End: 2025-06-26
Payer: COMMERCIAL

## 2025-06-26 DIAGNOSIS — Z12.31 SCREENING MAMMOGRAM FOR BREAST CANCER: Primary | ICD-10-CM

## 2025-08-13 ENCOUNTER — LAB (OUTPATIENT)
Dept: LAB | Facility: HOSPITAL | Age: 45
End: 2025-08-13
Payer: COMMERCIAL

## 2025-08-13 ENCOUNTER — TRANSCRIBE ORDERS (OUTPATIENT)
Dept: LAB | Facility: HOSPITAL | Age: 45
End: 2025-08-13
Payer: COMMERCIAL

## 2025-08-13 DIAGNOSIS — N95.1 SYMPTOMATIC MENOPAUSAL OR FEMALE CLIMACTERIC STATES: ICD-10-CM

## 2025-08-13 DIAGNOSIS — N95.1 SYMPTOMATIC MENOPAUSAL OR FEMALE CLIMACTERIC STATES: Primary | ICD-10-CM

## 2025-08-13 LAB
ESTRADIOL SERPL HS-MCNC: 364 PG/ML
PROGEST SERPL-MCNC: 0.25 NG/ML
T4 FREE SERPL-MCNC: 1 NG/DL (ref 0.92–1.68)
TSH SERPL DL<=0.05 MIU/L-ACNC: 2.09 UIU/ML (ref 0.27–4.2)

## 2025-08-13 PROCEDURE — 84403 ASSAY OF TOTAL TESTOSTERONE: CPT

## 2025-08-13 PROCEDURE — 84144 ASSAY OF PROGESTERONE: CPT

## 2025-08-13 PROCEDURE — 84443 ASSAY THYROID STIM HORMONE: CPT

## 2025-08-13 PROCEDURE — 84402 ASSAY OF FREE TESTOSTERONE: CPT

## 2025-08-13 PROCEDURE — 82670 ASSAY OF TOTAL ESTRADIOL: CPT

## 2025-08-13 PROCEDURE — 36415 COLL VENOUS BLD VENIPUNCTURE: CPT

## 2025-08-13 PROCEDURE — 84439 ASSAY OF FREE THYROXINE: CPT

## 2025-08-20 LAB
TESTOST FREE SERPL-MCNC: 0.4 PG/ML (ref 0–4.2)
TESTOST SERPL-MCNC: 22 NG/DL (ref 4–50)